# Patient Record
(demographics unavailable — no encounter records)

---

## 2024-10-07 NOTE — ASSESSMENT
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  #  headache on left side - constant pain, difficult to sleep at night. Had a fall on left side on April, still continuing, visited neurology after this fall, advised continuing pain medication - CT head Non- con sent  #Chronic back pain Lumbar spondylosis - Follows Neurology - MRI : multilevel stenosis - on gabapentin, Methocarbamol and Amitriptyline - PT exacerbate his symptoms, worse when supine  - Pain management and psychiatry eval recommended  #Peripheral neuropathy #DM - HBa1C 6.8  (Jun 12) - On Gabapentin to 800 TID - DM well controlled - c/w Pioglithazone, Ozempic and Xigduo  #obesity - Advised on low carb low fat diet and exercise  #HTN #HxCVA - BP well controlled - c/w Coreg, HCTZ, Amlodipine and Benazepril  #Vitamin D deficiency - Vit D 16  - c/w vit D 50K weekly for 8 weeks  #low grade urothelial cancer S/P urethral stricture biopsy - follows urology - c.w trimix for ED   #Abnormal MRI pelvis #Perirectal edema DD: ?stercoral colitis from constipation vs r/o other reason -Recent MRI in april showed  Generalized lumbar spinal stenosis due to a congenitally narrow lumbar spinal canal, prominence of the epidural fat and degenerative changes; overall mildly progressed since the previous MRI 3/1/2021. 2. Severe spinal stenosis at L4-5 and L5-S1. Moderate/severe spinal stenosis at L3-4. 3.  Multilevel moderate/severe neural foraminal stenosis most pronounced at L5-S1. -last colonoscopy in 2019 by he Lawrence showed 2 HP <1cm in size -Denies any family h/o colon cancer -Visted GI 2/2024: no show in August, adviced to make appoitment - c/w Miralax- avoid constipation  #HCM - Flu shot in pharmacy weeks ago  -Colonoscopy scheduled by GI?? no show - GI, podiatry, ophthalmology, pain management referral sent -RTC 6 mo or PRN.

## 2024-10-07 NOTE — PHYSICAL EXAM
[No Acute Distress] : no acute distress [Well Nourished] : well nourished [Well Developed] : well developed [Normal Sclera/Conjunctiva] : normal sclera/conjunctiva [Normal Outer Ear/Nose] : the outer ears and nose were normal in appearance [No JVD] : no jugular venous distention [No Lymphadenopathy] : no lymphadenopathy [Supple] : supple [No Respiratory Distress] : no respiratory distress  [No Accessory Muscle Use] : no accessory muscle use [Clear to Auscultation] : lungs were clear to auscultation bilaterally [Normal Rate] : normal rate  [Regular Rhythm] : with a regular rhythm [No Carotid Bruits] : no carotid bruits [No Varicosities] : no varicosities [No Edema] : there was no peripheral edema [Soft] : abdomen soft [Non Tender] : non-tender [Non-distended] : non-distended [No HSM] : no HSM [Normal Bowel Sounds] : normal bowel sounds [Normal Anterior Cervical Nodes] : no anterior cervical lymphadenopathy [No CVA Tenderness] : no CVA  tenderness [No Spinal Tenderness] : no spinal tenderness [No Joint Swelling] : no joint swelling [No Rash] : no rash [Coordination Grossly Intact] : coordination grossly intact [No Focal Deficits] : no focal deficits [Normal Insight/Judgement] : insight and judgment were intact

## 2024-10-07 NOTE — HISTORY OF PRESENT ILLNESS
[FreeTextEntry1] : Follow up [de-identified] : 65 Y Male  PMH of Urethral cancer w stricture (papillotubular, low grade, being followed), CVA (3/2022), non insulin dependent DM with neuropathy, HTN, ANAHY, OA, chronic back pain follows with Neurology, and Rt varicocele presents for follow up.  complaints of headache on left side, constant pain, difficult to sleep at night. Had a fall on left side on April, still continuing, visited neurology after this fall, advised continuing pain medication.

## 2025-04-03 NOTE — ASSESSMENT
[FreeTextEntry1] : #Organic Impotence - Sildenafil 100mg prn, pt understands not to take this the same day he performs ICI with Trimix - C/w Trimix 30-2-20, 20 units.  Patient understands that he should not inject more than 3-4 times per week.  He should alternate sides on his phallus.  He understands that he should only increase his dosage by 5 units at a time to a max of 50 units should he not have a satisfactory erection. He understands that if he has an erection lasting more than 4 hours he should go to his nearest ED for emergent decompression of his priapism and injection of reversal agent typically phenylephrine. Educational handout provided. - He will follow up in 1 year   #PSA screening - PSA 0.42 (1/2025) - Will repeat in 1 year   The patient understands that the risks of PDE5is include facial redness, flushing, GERD, back pain, priapism, chest pain/MI, arrhythmia, dizziness, drop in BP, impaired vision and loss of hearing. He understands that the medication may take up to an hour to function and requires sexual stimulation. He was told not to take his medication within 4 hours of alpha blockers, or not at all if ever taking nitroglycerin. Both sildenafil and vardenafil, but not tadalafil, have some cross-reactivity with PDE6 and thus may produce visual side effects. He also was told to go to the ER immediately if he noticed any blindness or visual changes, or for any painful erection lasting > 4 hrs. He denies any history nitrate medication use for angina.

## 2025-04-03 NOTE — PLAN

## 2025-04-03 NOTE — HISTORY OF PRESENT ILLNESS
[FreeTextEntry1] : Prior patient of Dr. Lopes for which he followed for erectile dysfunction.  He has been treated with Trimix with satisfactory results.  Also follows with Dr. Kia Doyle for urethral stricture which she had biopsy showed low-grade urothelial carcinoma in 2022.  Has had no recurrence. Pt has been injecting Trimix 30-2-20, 20 units without any complaints. No episodes of priapism.
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
## 2025-04-07 NOTE — PHYSICAL EXAM
[No Acute Distress] : no acute distress [Well Developed] : well developed [Normal Sclera/Conjunctiva] : normal sclera/conjunctiva [No JVD] : no jugular venous distention [No Respiratory Distress] : no respiratory distress  [No Accessory Muscle Use] : no accessory muscle use [Normal Rate] : normal rate  [Regular Rhythm] : with a regular rhythm [Non Tender] : non-tender [Normal] : affect was normal and insight and judgment were intact [de-identified] : Lesion on knee s/p fall with abrasian; tender to palpation over cervical and upper thoracic as well as L trapezius

## 2025-04-07 NOTE — REVIEW OF SYSTEMS
[Fatigue] : fatigue [Vision Problems] : vision problems [Headache] : headache [Fever] : no fever [Chills] : no chills [Earache] : no earache [Hearing Loss] : no hearing loss [Chest Pain] : no chest pain [Palpitations] : no palpitations [Wheezing] : no wheezing [Abdominal Pain] : no abdominal pain [Back Pain] : no back pain [Dizziness] : no dizziness

## 2025-04-18 NOTE — PHYSICAL EXAM
[Alert] : alert [Normal Voice/Communication] : normal voice/communication [Sclera] : the sclera and conjunctiva were normal [Hearing Threshold Finger Rub Not [place]] : hearing was normal [Normal Appearance] : the appearance of the neck was normal [No Respiratory Distress] : no respiratory distress [No Acc Muscle Use] : no accessory muscle use [Heart Rate And Rhythm] : heart rate was normal and rhythm regular [Normal S1, S2] : normal S1 and S2 [Bowel Sounds] : normal bowel sounds [Abdomen Tenderness] : non-tender [No Masses] : no abdominal mass palpated [Abdomen Soft] : soft [Oriented To Time, Place, And Person] : oriented to person, place, and time

## 2025-04-18 NOTE — ASSESSMENT
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  patient reports epigastric pain burning in sensation, worse with mints and dark chocolate.  Patient is here for colonoscopy results.   #Epigastric pain likely GERD vs PUD - Patient on mounjaro - reports symptoms even before starting mounjaro - Not on any PPI - No other alarm signs and symptoms - on ASA  RECS - Will schedule for EGD, hold mounjaro 2 weeks prior - Ordered pepcid - Avoid caffeine, dark choclate, mints, tomatoes - GERD education provided  #) Abnormal MRI pelvis #) Perirectal edema DD: ?stercoral colitis from constipation vs r/o other reason   -MRI pelvis in Aug 2023 showed perirectal edematous changes with presacral edema. -s/p colonoscopy in 2019 by he Lawrence showed 2 HP <1cm in size  -Denies any family h/o colon cancer  -Only on aspirin  - s/p colonoscopy 5/24: BBPS:6 Polyp (3 mm) in the proximal descending colon. (TA). 	TWo HP Polyps (3 mm) in the sigmoid colon. - currently asymptomatic  Recs:  Recommend repeat colonoscopy in 5 years Will repeat imaging MRI pelvis RTC in 1month

## 2025-04-18 NOTE — HISTORY OF PRESENT ILLNESS
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  patient reports epigastric pain burning in sensation, worse with mints and dark chocolate.  Patient is here for colonoscopy results.     Denies dysphagia, odynophagia, weight loss, hematemesis, melena, hematochezia, nausea, vomiting.  Social history negx3 Family history negative for GI cancers

## 2025-04-18 NOTE — PHYSICAL EXAM
[Alert] : alert [Normal Voice/Communication] : normal voice/communication [Sclera] : the sclera and conjunctiva were normal [Hearing Threshold Finger Rub Not [place]] : hearing was normal [Normal Appearance] : the appearance of the neck was normal [No Respiratory Distress] : no respiratory distress [No Acc Muscle Use] : no accessory muscle use [Heart Rate And Rhythm] : heart rate was normal and rhythm regular [Normal S1, S2] : normal S1 and S2 [Bowel Sounds] : normal bowel sounds [Abdomen Tenderness] : non-tender [No Masses] : no abdominal mass palpated [Abdomen Soft] : soft [Oriented To Time, Place, And Person] : oriented to person, place, and time